# Patient Record
Sex: FEMALE | Race: WHITE
[De-identification: names, ages, dates, MRNs, and addresses within clinical notes are randomized per-mention and may not be internally consistent; named-entity substitution may affect disease eponyms.]

---

## 2020-06-23 ENCOUNTER — HOSPITAL ENCOUNTER (EMERGENCY)
Dept: HOSPITAL 61 - ER | Age: 45
Discharge: HOME | End: 2020-06-23
Payer: COMMERCIAL

## 2020-06-23 VITALS
BODY MASS INDEX: 29.38 KG/M2 | WEIGHT: 176.37 LBS | SYSTOLIC BLOOD PRESSURE: 143 MMHG | HEIGHT: 65 IN | DIASTOLIC BLOOD PRESSURE: 83 MMHG

## 2020-06-23 DIAGNOSIS — Z88.1: ICD-10-CM

## 2020-06-23 DIAGNOSIS — S01.111A: Primary | ICD-10-CM
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
## 2020-06-23 DIAGNOSIS — F10.129: ICD-10-CM
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
## 2020-06-23 PROCEDURE — 99283 EMERGENCY DEPT VISIT LOW MDM: CPT

## 2020-06-23 NOTE — PHYS DOC
General Adult


EDM:


Chief Complaint:  ALCOHOL INTOXICATION





HPI:


HPI:





Patient is a 44  year old female who arrives via EMS with report of alcohol 

intoxication.  Patient had been out at the [facility] and had reportedly leaned over

a rail that was not fixed to the floor and rail fell over and patient with it.  

Patient bumped her head but had no loss of consciousness.  Patient states that 

she had 3 beers but states that she had a gastric sleeve and so gets intoxicated

very easily.  Patient indicates that she does not want any blood work or imaging

performed.  She did bump her head and has a small abrasion just lateral to the 

right eyebrow.  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  []





Review of Systems:


Review of Systems:


Constitutional:   Denies fever or chills. []


Eyes:   Denies change in visual acuity. []


Respiratory:   Denies cough or shortness of breath. [] 


Cardiovascular:   Denies chest pain or edema. [] 


Integument:   Positive abrasion. [] 


Neurologic:   Complains of mild headache without focal weakness or sensory 

changes. []





Heart Score:


Risk Factors:


Risk Factors:  DM, Current or recent (<one month) smoker, HTN, HLP, family histo

ry of CAD, obesity.


Risk Scores:


Score 0 - 3:  2.5% MACE over next 6 weeks - Discharge Home


Score 4 - 6:  20.3% MACE over next 6 weeks - Admit for Clinical Observation


Score 7 - 10:  72.7% MACE over next 6 weeks - Early Invasive Strategies





Allergies:


Allergies:





Allergies








Coded Allergies Type Severity Reaction Last Updated Verified


 


  neomycin Allergy Unknown  6/23/20 Yes











Physical Exam:


PE:





Constitutional: Well developed, well nourished, no acute distress, appears 

intoxicated. []


HENT: Normocephalic, with small abrasion/very superficial laceration noted just 

lateral to the right eyebrow with minimal soft tissue swelling, bilateral 

external ears normal, oropharynx moist, no oral exudates, nose normal. []


Eyes: PERRLA, EOMI, conjunctiva normal, no discharge. [] 


Neck: Normal range of motion, no tenderness, supple. [] 


Cardiovascular: Regular rate and rhythm []


Lungs & Thorax:  Bilateral breath sounds clear to auscultation []


Abdomen: Bowel sounds normal, soft, no tenderness. [] 


Extremities: No tenderness, no cyanosis, no clubbing, ROM intact. [] 


Neurologic: Alert and oriented X 3, no focal deficits noted. []





EKG:


EKG:


[]





Radiology/Procedures:


Radiology/Procedures:


[]





Course & Med Decision Making:


Course & Med Decision Making


Pertinent Labs and Imaging studies reviewed. (See chart for details)





[]





Dragon Disclaimer:


Dragon Disclaimer:


This electronic medical record was generated, in whole or in part, using a voice

 recognition dictation system.





Departure


Departure


Impression:  


   Primary Impression:  


   Alcohol intoxication


   Qualified Codes:  F10.920 - Alcohol use, unspecified with intoxication, 

   uncomplicated


   Additional Impression:  


   Closed head injury


   Qualified Codes:  S09.90XA - Unspecified injury of head, initial encounter


Disposition:  01 HOME, SELF-CARE


Condition:  STABLE


Patient Instructions:  Alcohol Intoxication, Head Injury, Adult





Justicifation of Admission Dx:


Justifications for Admission:


Justification of Admission Dx:  Comment: (Not applicable)











MARTHA SIMS Jr. DO          Jun 23, 2020 22:40